# Patient Record
Sex: MALE | Race: WHITE | NOT HISPANIC OR LATINO | URBAN - METROPOLITAN AREA
[De-identification: names, ages, dates, MRNs, and addresses within clinical notes are randomized per-mention and may not be internally consistent; named-entity substitution may affect disease eponyms.]

---

## 2017-05-24 ENCOUNTER — IMPORTED ENCOUNTER (OUTPATIENT)
Dept: URBAN - METROPOLITAN AREA CLINIC 31 | Facility: CLINIC | Age: 67
End: 2017-05-24

## 2017-05-24 PROBLEM — H17.89: Noted: 2017-05-24

## 2017-05-24 PROBLEM — H25.813: Noted: 2017-05-24

## 2017-05-24 PROBLEM — H04.123: Noted: 2017-05-24

## 2017-05-24 PROCEDURE — 92014 COMPRE OPH EXAM EST PT 1/>: CPT

## 2017-05-24 NOTE — PATIENT DISCUSSION
1. Combined Types of Cataract OU: Explained how cataracts can effect vision. Recommend clinical observation. The patient was advised to contact us if any change or worsening of vision. 2. S/P  Lasik: monitor3. Dry Eyes OU:  Start artificials tears. Encouraged regular use. 4.  Return for an appointment in 1 year for comprehensive exam. with Dr. Paulina Jenkins.

## 2017-11-14 ENCOUNTER — IMPORTED ENCOUNTER (OUTPATIENT)
Dept: URBAN - METROPOLITAN AREA CLINIC 31 | Facility: CLINIC | Age: 67
End: 2017-11-14

## 2017-11-14 PROBLEM — R51: Noted: 2017-11-14

## 2017-11-14 PROBLEM — G43.B0: Noted: 2017-11-14

## 2017-11-14 PROBLEM — H04.123: Noted: 2017-11-14

## 2017-11-14 PROBLEM — H25.813: Noted: 2017-11-14

## 2017-11-14 PROCEDURE — 92014 COMPRE OPH EXAM EST PT 1/>: CPT

## 2017-11-14 NOTE — PATIENT DISCUSSION
1.  Opthalmic Migraine - Discussed opthalmic migraine auras and possibilty of avoiding triggers. Continue to follow with primary medical provider. 2. Dry Eye OU:  Continue current management with Artificial Tears. 3.  Headache of non-ocular origin - Patient has headache that cannot be explained by ocular exam and testing. Pain is usually when awakening in the morning. May be sinus related so see PCP. 4. Combined Types of Cataract OU: Explained how cataracts can effect vision. Recommend clinical observation. The patient was advised to contact us if any change or worsening of vision. To consider consult if VA worsens. 5. Return for an appointment in 6 months for comprehensive exam. with Dr. Shelbie Gonzalez.

## 2018-05-31 ENCOUNTER — IMPORTED ENCOUNTER (OUTPATIENT)
Dept: URBAN - METROPOLITAN AREA CLINIC 31 | Facility: CLINIC | Age: 68
End: 2018-05-31

## 2018-05-31 PROBLEM — H17.89: Noted: 2018-05-31

## 2018-05-31 PROBLEM — H25.813: Noted: 2018-05-31

## 2018-05-31 PROBLEM — H20.023: Noted: 2018-05-31

## 2018-05-31 PROCEDURE — 92014 COMPRE OPH EXAM EST PT 1/>: CPT

## 2018-05-31 NOTE — PATIENT DISCUSSION
Iritis: recurrent episode OU: Discussed that steroid and dilating drops will help with discomfort and resolve intraocular inflammation. Call office imediately with decreased vision increased pain and/or increased redness. Recommend systemic workup for etiolgy of inflammation including ESR CRP RF MURIEL HLA B-27 RPR FTA-abs ACE lyme titer and PPD.

## 2018-05-31 NOTE — PATIENT DISCUSSION
1. Combined Types of Cataract OU: Explained how cataracts can effect vision. Recommend clinical observation. The patient was advised to contact us if any change or worsening of vision. 2. Iritis: recurrent episode OU: Discussed that steroid and dilating drops will help with discomfort and resolve intraocular inflammation. Call office imediately with decreased vision increased pain and/or increased redness. Recommend systemic workup for etiolgy of inflammation including ESR CRP RF MURIEL HLA B-27 RPR FTA-abs ACE lyme titer and PPD. 3. S/P  Lasik OU - monitor4. Return for an appointment in 1 year for comprehensive exam. with Dr. Cy Hall.

## 2019-01-10 ENCOUNTER — IMPORTED ENCOUNTER (OUTPATIENT)
Dept: URBAN - METROPOLITAN AREA CLINIC 31 | Facility: CLINIC | Age: 69
End: 2019-01-10

## 2019-01-10 PROBLEM — H17.89: Noted: 2019-01-10

## 2019-01-10 PROBLEM — H20.023: Noted: 2019-01-10

## 2019-01-10 PROBLEM — H25.813: Noted: 2019-01-10

## 2019-01-10 PROCEDURE — 92014 COMPRE OPH EXAM EST PT 1/>: CPT

## 2019-01-10 NOTE — PATIENT DISCUSSION
1. Combined Types of Cataract OD>OS -monitor. 2.  Iritis: recurrent OU - use PF 1 gt QD OU. Monitor IOP. 3.  S/P  Lasik OU - monitor4. Return for an appointment in 6 months for comprehensive exam. with Dr. Maryana Silva.

## 2019-07-10 ENCOUNTER — IMPORTED ENCOUNTER (OUTPATIENT)
Dept: URBAN - METROPOLITAN AREA CLINIC 31 | Facility: CLINIC | Age: 69
End: 2019-07-10

## 2019-07-10 PROBLEM — H20.023: Noted: 2019-07-10

## 2019-07-10 PROBLEM — H25.813: Noted: 2019-07-10

## 2019-07-10 PROBLEM — H17.89: Noted: 2019-07-10

## 2019-07-10 PROCEDURE — 92014 COMPRE OPH EXAM EST PT 1/>: CPT

## 2019-07-10 NOTE — PATIENT DISCUSSION
1. Combined Types of Cataract OD>OS - monitor. If any further change in 6 months then consult. 2. Iritis: recurrent OU - quiet today. Continue PF 1 gt QD OU. Monitor IOP. 3. Had elevated sed and CRP. Was treated with steroids and values are back to normal. Important to follow up with his neurologist and rheumatologist. Temporal artery biopsy was negative at multiple sites. 3.  S/P  Lasik OU - monitor4. Return for an appointment in 6 months for comprehensive exam with Dr. Iam Gray.

## 2020-01-16 ENCOUNTER — IMPORTED ENCOUNTER (OUTPATIENT)
Dept: URBAN - METROPOLITAN AREA CLINIC 31 | Facility: CLINIC | Age: 70
End: 2020-01-16

## 2020-01-16 PROBLEM — H25.813: Noted: 2020-01-16

## 2020-01-16 PROBLEM — H20.023: Noted: 2020-01-16

## 2020-01-16 PROBLEM — H17.89: Noted: 2020-01-16

## 2020-01-16 PROCEDURE — 92014 COMPRE OPH EXAM EST PT 1/>: CPT

## 2020-01-16 PROCEDURE — 92015 DETERMINE REFRACTIVE STATE: CPT

## 2020-01-16 NOTE — PATIENT DISCUSSION
1. Combined Types of Cataract OD>OS - Consult for OD 2. Iritis: recurrent OU - quiet today. Continue PF 1 gt but try QOD OU. Monitor IOP. 3. Had elevated sed and CRP. Was treated with steroids and values are back to normal. Important to follow up with his neurologist and rheumatologist. Temporal artery biopsy was negative at multiple sites. 3.  S/P  Lasik OU - monitor4. Return for an appointment in 1 month for cataract consult OD with Dr. Ermelinda Vega.

## 2020-03-11 ENCOUNTER — IMPORTED ENCOUNTER (OUTPATIENT)
Dept: URBAN - METROPOLITAN AREA CLINIC 31 | Facility: CLINIC | Age: 70
End: 2020-03-11

## 2020-03-11 PROBLEM — H25.813: Noted: 2020-03-11

## 2020-03-11 PROBLEM — H20.023: Noted: 2020-03-11

## 2020-03-11 PROBLEM — H25.811: Noted: 2020-03-11

## 2020-03-11 PROCEDURE — 99213 OFFICE O/P EST LOW 20 MIN: CPT

## 2020-03-11 NOTE — PATIENT DISCUSSION
Discussed the risks benefits alternatives and limitations of cataract surgery including infection bleeding loss of vision retinal tears detachment. The patient stated a full understanding and a desire to proceed with the procedure in both eyes. Refractive options were reviewed. Patient has elected to be optimized for distance vision in both eyes. The patient will still need glasses for reading and to possibly fine tune distance vision. OD slow taper of prednisolone after Cat sx.  4x/d with decrease a drop every 2 weeks  Dextenza

## 2020-03-11 NOTE — PATIENT DISCUSSION
1.  Cataract OD: Discussed the risks benefits alternatives and limitations of cataract surgery including infection bleeding loss of vision retinal tears detachment. The patient stated a full understanding and a desire to proceed with the procedure in the right eye. Refractive options reviewed. Patient understands IOL calcs are more difficult and that intraoperative ORA measurements will increase the success but does not guarantee not needing a refractive enhancement. Pt desired to schedule surgery OD with ORA guidance. PORE plano  OD slow taper of prednisolone after Cat sx. 4x/d with decrease a drop every 2 weeks  Dextenza 2. Iritis:  recurrent episode OU: Quiet today use PF OU qd 3. Return for an appointment for [address] [place] [address] with Dr. Chandana Michelle.

## 2020-05-13 ENCOUNTER — IMPORTED ENCOUNTER (OUTPATIENT)
Dept: URBAN - METROPOLITAN AREA CLINIC 31 | Facility: CLINIC | Age: 70
End: 2020-05-13

## 2020-05-13 PROBLEM — H25.811: Noted: 2020-05-13

## 2020-05-13 PROCEDURE — 92136 OPHTHALMIC BIOMETRY: CPT

## 2020-05-13 PROCEDURE — 92025 CPTRIZED CORNEAL TOPOGRAPHY: CPT

## 2020-05-13 NOTE — PATIENT DISCUSSION
Cataract OD: Discussed the risks benefits alternatives and limitations of cataract surgery including infection bleeding loss of vision retinal tears detachment. The patient stated a full understanding and a desire to proceed with the procedure in the right eye. Refractive options reviewed. Patient understands IOL calcs are more difficult and that intraoperative ORA measurements will increase the success but does not guarantee not needing a refractive enhancement. Pt desired to schedule surgery OD with ORA guidance. ghanshyam shows prior lasik and mild irregularity

## 2020-05-19 ENCOUNTER — IMPORTED ENCOUNTER (OUTPATIENT)
Dept: URBAN - METROPOLITAN AREA CLINIC 31 | Facility: CLINIC | Age: 70
End: 2020-05-19

## 2020-05-19 PROBLEM — Z96.1: Noted: 2020-05-19

## 2020-05-19 PROCEDURE — 99024 POSTOP FOLLOW-UP VISIT: CPT

## 2020-05-19 NOTE — PATIENT DISCUSSION
Post-Op Day #1 - Cataract Surgery Right Eye (OD) - doing well. Tears prn. Continue postop drops as directed. Call office with symptoms of pain redness or decreased vision in operative eye. 1 drop tobramycin instilled.h/o chronic iritis will need steroid drops chronich/o LasikReturn for an appointment in 1 week for post op refraction. with Dr. Lucille Gonzales.

## 2020-05-27 ENCOUNTER — IMPORTED ENCOUNTER (OUTPATIENT)
Dept: URBAN - METROPOLITAN AREA CLINIC 31 | Facility: CLINIC | Age: 70
End: 2020-05-27

## 2020-05-27 PROBLEM — Z96.1: Noted: 2020-05-27

## 2020-05-27 PROCEDURE — 99024 POSTOP FOLLOW-UP VISIT: CPT

## 2020-05-27 NOTE — PATIENT DISCUSSION
1.  Post-Op Week #1 - Cataract Surgery Right Eye (OD) - Intraocular lens stable and surgery very well healed. Patient to resume all normal activities. Finish postop drops as directed. Final Refraction given if necessary. Call with any problems. Healing well. Discussed VA improvement. Recheck refraction at next visit. 2. Return for an appointment in 3 weeks for post op exam. with Dr. Arnulfo Hussein.

## 2020-06-09 ENCOUNTER — IMPORTED ENCOUNTER (OUTPATIENT)
Dept: URBAN - METROPOLITAN AREA CLINIC 31 | Facility: CLINIC | Age: 70
End: 2020-06-09

## 2020-06-09 PROBLEM — Z96.1: Noted: 2020-06-09

## 2020-06-09 PROCEDURE — 99024 POSTOP FOLLOW-UP VISIT: CPT

## 2020-06-09 NOTE — PATIENT DISCUSSION
1.  Post-Op Cataract Surgery 15-90 days Right Eye (OD):  Doing well with stable vision. Monitor for changes2. Papules on arm and chest are very itchy with no pain or tenderness. Appears to be allergic type dermatitis and not shingles. Pt is waiting for call from Dr. Mirlande Lo dermatology. 3.  Return for an appointment in 2 weeks for post op exam. with Dr. Jona Atkinson.

## 2020-06-09 NOTE — PATIENT DISCUSSION
Papules on arm and chest are very itchy with no pain or tenderness. Appears to be allergic type dermatitis and not shingles. Pt is waiting for call from Dr. Marline Mcarthur dermatology.

## 2020-06-24 ENCOUNTER — IMPORTED ENCOUNTER (OUTPATIENT)
Dept: URBAN - METROPOLITAN AREA CLINIC 31 | Facility: CLINIC | Age: 70
End: 2020-06-24

## 2020-06-24 PROBLEM — Z96.1: Noted: 2020-06-24

## 2020-06-24 PROCEDURE — 99024 POSTOP FOLLOW-UP VISIT: CPT

## 2020-06-24 NOTE — PATIENT DISCUSSION
1.  Post-Op Cataract Surgery 15-90 days Right Eye (OD):  Doing well with stable vision. Monitor for changes2. Can continue with OTC or possible SV rx or progressives if necessary. 3. Return for an appointment in 4 months for dilated fundus exam. with Dr. Regine Acosta.

## 2020-10-07 ENCOUNTER — IMPORTED ENCOUNTER (OUTPATIENT)
Dept: URBAN - METROPOLITAN AREA CLINIC 31 | Facility: CLINIC | Age: 70
End: 2020-10-07

## 2020-10-07 PROBLEM — Z96.1: Noted: 2020-10-07

## 2020-10-07 PROBLEM — H25.812: Noted: 2020-10-07

## 2020-10-07 PROBLEM — H20.023: Noted: 2020-10-07

## 2020-10-07 PROCEDURE — 99214 OFFICE O/P EST MOD 30 MIN: CPT

## 2020-10-07 NOTE — PATIENT DISCUSSION
1.  Pseudophakia OD - IOL stable. Monitor for changes in vision. 2. Combined Types of Cataract OS: Vision OS progressively worsening. Consult. 3. Iritis: recurrent episode OU: Complete work up with rheumatology unable to identify cause of autoimmune inflammatory conditions. 4. Return for an appointment in 6 months for cataract evaluation with Dr. Chandana Michelle.

## 2020-10-07 NOTE — PATIENT DISCUSSION
1. Combined Types of Cataract OS: Vision OS progressively worsening. Consult. 3. Iritis: recurrent episode OU: Complete work up with rheumatology unable to identify cause of autoimmune inflammatory conditions. 4. Return for an appointment in 6 months for cataract evaluation. with Dr. Qian Yun.

## 2021-03-25 ENCOUNTER — IMPORTED ENCOUNTER (OUTPATIENT)
Dept: URBAN - METROPOLITAN AREA CLINIC 31 | Facility: CLINIC | Age: 71
End: 2021-03-25

## 2021-03-25 PROBLEM — H20.023: Noted: 2021-03-25

## 2021-03-25 PROBLEM — H43.811: Noted: 2021-03-25

## 2021-03-25 PROBLEM — H25.812: Noted: 2021-03-25

## 2021-03-25 PROBLEM — Z96.1: Noted: 2021-03-25

## 2021-03-25 PROCEDURE — 92250 FUNDUS PHOTOGRAPHY W/I&R: CPT

## 2021-03-25 PROCEDURE — 99214 OFFICE O/P EST MOD 30 MIN: CPT

## 2021-03-25 NOTE — PATIENT DISCUSSION
1.  Pseudophakia OD - IOL stable. Monitor for changes in vision. 2. Combined Types of Cataract OS: Vision OS progressively worsening. Consult. 3. Iritis: hx of recurrent episodes OU: Complete work up with rheumatology unable to identify cause of autoimmune inflammatory conditions. 4. Return for an appointment in 6 weeks for DFTA with Dr. Jose Goss.

## 2021-03-25 NOTE — PATIENT DISCUSSION
1.  PVD OD: Patient was cautioned to call our office immediately if they experience a substantial change in their symptoms such as an increase in floaters persistent flashes loss of visual field (may appear as a shadow or a curtain) or decrease in visual acuity as these may indicate a retinal tear or detachment. If this is a new problem patient will need to return for re-examination  as determined by the [address]. Pseudophakia OD - IOL stable. Monitor for changes in vision. 3. Combined Types of Cataract OS: Vision OS progressively worsening. Consult. 4. Iritis: hx of recurrent episodes OU: Complete work up with rheumatology unable to identify cause of autoimmune inflammatory conditions. 5. Return for an appointment in 6 weeks for DFTA with Dr. Stefan Cooley.

## 2021-04-07 ENCOUNTER — IMPORTED ENCOUNTER (OUTPATIENT)
Dept: URBAN - METROPOLITAN AREA CLINIC 31 | Facility: CLINIC | Age: 71
End: 2021-04-07

## 2021-04-07 PROBLEM — H26.491: Noted: 2021-04-07

## 2021-04-07 PROBLEM — H20.13: Noted: 2021-04-07

## 2021-04-07 PROBLEM — H25.812: Noted: 2021-04-07

## 2021-04-07 PROBLEM — Z96.1: Noted: 2021-04-07

## 2021-04-07 PROCEDURE — 92015 DETERMINE REFRACTIVE STATE: CPT

## 2021-04-07 PROCEDURE — 99213 OFFICE O/P EST LOW 20 MIN: CPT

## 2021-04-07 PROCEDURE — 92025 CPTRIZED CORNEAL TOPOGRAPHY: CPT

## 2021-04-07 NOTE — PATIENT DISCUSSION
1.  Cataract OS: Discussed the risks benefits alternatives and limitations of cataract surgery including infection bleeding loss of vision retinal tears detachment. The patient stated a full understanding and a desire to proceed with the procedure in the left eye. Refractive options reviewed. Patient understands IOL calcs are more difficult and that intraoperative ORA measurements will increase the success but does not guarantee not needing a refractive enhancement. Pt desired to schedule surgery OS with ORA guidance. Pore plano h/o iritis PRED 4x/d with taper a drop every 2 weeks down to qd chronic with DextenzaPaperwork only2. Pseudophakia OD - IOL stable. Monitor for changes in vision. 3. PCO  OD (Posterior Capsule Opacification)  Not visually significant at this time. Monitor for yag capsulotomy necessity. 4. Chronic iritis Continue topical steroids as directed.

## 2021-04-09 ENCOUNTER — IMPORTED ENCOUNTER (OUTPATIENT)
Dept: URBAN - METROPOLITAN AREA CLINIC 31 | Facility: CLINIC | Age: 71
End: 2021-04-09

## 2021-04-20 ENCOUNTER — IMPORTED ENCOUNTER (OUTPATIENT)
Dept: URBAN - METROPOLITAN AREA CLINIC 31 | Facility: CLINIC | Age: 71
End: 2021-04-20

## 2021-04-20 PROBLEM — Z96.1: Noted: 2021-04-20

## 2021-04-20 PROCEDURE — 99024 POSTOP FOLLOW-UP VISIT: CPT

## 2021-04-20 NOTE — PATIENT DISCUSSION
1.  Post-Op Day #1 - Cataract Surgery Left Eye (OS) - doing well. Tears prn. Continue postop drops as directed. Call office with symptoms of pain redness or decreased vision in operative eye.  1 drop of tobramycin instilled2. Return for an appointment in 1 week for post op exam. with Dr. Eugene Tabor.

## 2021-04-27 ENCOUNTER — IMPORTED ENCOUNTER (OUTPATIENT)
Dept: URBAN - METROPOLITAN AREA CLINIC 31 | Facility: CLINIC | Age: 71
End: 2021-04-27

## 2021-04-27 PROBLEM — Z96.1: Noted: 2021-04-27

## 2021-04-27 PROCEDURE — 99024 POSTOP FOLLOW-UP VISIT: CPT

## 2021-04-27 NOTE — PATIENT DISCUSSION
1.  Post-Op Week #1 - Cataract Surgery Left Eye (OS) -  Intraocular lens stable and surgery very well healed. Patient to resume all normal activities. Finish postop drops as directed. Final Refraction given if decided to get progressives. Use Systane between medicated gtt. .  Call with any problems. 2. Return for an appointment in 4 months for dilated fundus exam. with Dr. Christopher Brooks.

## 2021-08-31 ENCOUNTER — IMPORTED ENCOUNTER (OUTPATIENT)
Dept: URBAN - METROPOLITAN AREA CLINIC 31 | Facility: CLINIC | Age: 71
End: 2021-08-31

## 2021-08-31 PROBLEM — H43.811: Noted: 2021-08-31

## 2021-08-31 PROBLEM — Z96.1: Noted: 2021-08-31

## 2021-08-31 PROBLEM — H20.023: Noted: 2021-08-31

## 2021-08-31 PROCEDURE — 99214 OFFICE O/P EST MOD 30 MIN: CPT

## 2021-08-31 PROCEDURE — 92250 FUNDUS PHOTOGRAPHY W/I&R: CPT

## 2021-08-31 NOTE — PATIENT DISCUSSION
1.  Pseudophakia OU - IOLs stable. Monitor for changes in vision. 2. PVD OD: Patient was cautioned to call our office immediately if they experience a substantial change in their symptoms such as an increase in floaters persistent flashes loss of visual field (may appear as a shadow or a curtain) or decrease in visual acuity as these may indicate a retinal tear or detachment. If this is a new problem patient will need to return for re-examination  as determined by the [email or web address] Iritis: recurrent episode OU: none today. Uses Pred QD. Switch to Inveltys when Pred finished and monitor IOPs. 4. Return for an appointment in 6 months for pressure check with Dr. Eugene Tabor.

## 2022-02-28 ENCOUNTER — IMPORTED ENCOUNTER (OUTPATIENT)
Dept: URBAN - METROPOLITAN AREA CLINIC 31 | Facility: CLINIC | Age: 72
End: 2022-02-28

## 2022-02-28 PROBLEM — H43.811: Noted: 2022-02-28

## 2022-02-28 PROBLEM — H20.023: Noted: 2022-02-28

## 2022-02-28 PROBLEM — Z96.1: Noted: 2022-02-28

## 2022-02-28 PROCEDURE — 99214 OFFICE O/P EST MOD 30 MIN: CPT

## 2022-02-28 NOTE — PATIENT DISCUSSION
1.  Pseudophakia OU - IOLs stable. Monitor for changes in vision. 2. PVD OD: Monitor. 3. Iritis: recurrent episode OU: none today. Uses Pred QD. Switch to Inveltys but found not as effective as Pred. No IOP spikes so can continue to use QD OU but monitor IOPs. 4. Return for an appointment in 6 months for complete exam with Dr. Shraddha Dumont.

## 2022-04-02 ASSESSMENT — VISUAL ACUITY
OD_CC: 20/30
OD_CC: 20/25-1
OD_CC: 20/20-3
OS_CC: 20/30-2
OD_CC: 20/20
OS_CC: 20/20-1
OD_CC: 20/30
OS_CC: 20/20-1
OS_CC: 20/25-1
OS_CC: 20/50-2
OS_CC: 20/20
OS_SC: 20/70
OS_GLARE: 20/50MED
OU_CC: 20/20
OD_CC: 20/40+2
OD_CC: 20/40+1
OS_CC: 20/20-1
OD_CC: 20/30-1
OS_CC: 20/25
OD_CC: 20/25-2
OS_CC: 20/30-2
OD_CC: 20/20
OU_CC: 20/40
OS_CC: 20/20-1
OD_CC: 20/20
OS_PH: SC 20/25 -3
OD_CC: 20/25-2
OS_CC: 20/25-1
OU_CC: 20/20
OD_CC: 20/30
OD_CC: 20/25
OS_CC: 20/25
OS_CC: 20/30
OD_SC: 20/40
OD_CC: 20/25-1
OU_CC: 20/25
OS_PH: SC 20/30 -2
OD_CC: 20/30+2
OS_CC: 20/25
OD_CC: 20/30
OS_CC: 20/50
OS_CC: 20/25
OS_CC: 20/20
OS_CC: 20/50-2

## 2022-04-02 ASSESSMENT — TONOMETRY
OS_IOP_MMHG: 15
OD_IOP_MMHG: 12
OD_IOP_MMHG: 18
OS_IOP_MMHG: 14
OD_IOP_MMHG: 13
OS_IOP_MMHG: 18
OD_IOP_MMHG: 14
OD_IOP_MMHG: 14
OS_IOP_MMHG: 16
OD_IOP_MMHG: 17
OS_IOP_MMHG: 16
OS_IOP_MMHG: 14
OS_IOP_MMHG: 14
OS_IOP_MMHG: 16
OS_IOP_MMHG: 14
OD_IOP_MMHG: 20
OD_IOP_MMHG: 14
OD_IOP_MMHG: 14
OD_IOP_MMHG: 19
OS_IOP_MMHG: 12
OD_IOP_MMHG: 14
OS_IOP_MMHG: 18
OD_IOP_MMHG: 16
OS_IOP_MMHG: 18
OD_IOP_MMHG: 14
OS_IOP_MMHG: 13

## 2022-07-09 ENCOUNTER — TELEPHONE ENCOUNTER (OUTPATIENT)
Dept: URBAN - METROPOLITAN AREA CLINIC 121 | Facility: CLINIC | Age: 72
End: 2022-07-09

## 2022-07-09 RX ORDER — EXTENDED PHENYTOIN SODIUM 30 MG/1
CAPSULE ORAL
Refills: 0 | OUTPATIENT
Start: 2012-01-05 | End: 2014-09-11

## 2022-07-09 RX ORDER — ALPRAZOLAM 1 MG
TABLET ORAL
Refills: 0 | OUTPATIENT
Start: 2012-01-05 | End: 2014-09-11

## 2022-07-09 RX ORDER — LOSARTAN POTASSIUM 50 MG/1
TABLET, FILM COATED ORAL
Refills: 0 | OUTPATIENT
Start: 2012-01-05 | End: 2014-09-11

## 2022-07-09 RX ORDER — VITAMIN B COMPLEX
CAPSULE ORAL
Refills: 0 | OUTPATIENT
Start: 2014-09-11 | End: 2014-09-11

## 2022-07-09 RX ORDER — OMEGA-3S/DHA/EPA/FISH OIL 980-1400MG
CAPSULE,DELAYED RELEASE (ENTERIC COATED) ORAL
Refills: 0 | OUTPATIENT
Start: 2012-01-05 | End: 2014-09-11

## 2022-07-09 RX ORDER — MAGNESIUM HYDROXIDE 400 MG/5ML
SUSPENSION, ORAL (FINAL DOSE FORM) ORAL
Refills: 0 | OUTPATIENT
Start: 2012-01-05 | End: 2014-09-11

## 2022-07-09 RX ORDER — ATENOLOL 50 MG/1
TABLET ORAL
Refills: 0 | OUTPATIENT
Start: 2012-01-05 | End: 2014-09-11

## 2022-07-09 RX ORDER — LOVASTATIN 10 MG/1
TABLET ORAL
Refills: 0 | OUTPATIENT
Start: 2012-01-05 | End: 2014-09-11

## 2022-07-09 RX ORDER — OXYCODONE AND ACETAMINOPHEN 10; 325 MG/1; MG/1
TABLET ORAL
Refills: 0 | OUTPATIENT
Start: 2012-01-05 | End: 2014-09-11

## 2022-07-09 RX ORDER — ASPIRIN 81 MG/1
TABLET, DELAYED RELEASE ORAL
Refills: 0 | OUTPATIENT
Start: 2012-01-05 | End: 2014-09-11

## 2022-07-09 RX ORDER — HYOSCYAMINE SULFATE 0.12 MG/1
TABLET, ORALLY DISINTEGRATING ORAL; SUBLINGUAL
Refills: 0 | OUTPATIENT
Start: 2012-01-05 | End: 2014-09-11

## 2022-07-10 ENCOUNTER — TELEPHONE ENCOUNTER (OUTPATIENT)
Dept: URBAN - METROPOLITAN AREA CLINIC 121 | Facility: CLINIC | Age: 72
End: 2022-07-10

## 2022-07-10 RX ORDER — ASPIRIN 81 MG/1
TABLET, DELAYED RELEASE ORAL
Refills: 0 | Status: ACTIVE | COMMUNITY
Start: 2014-09-11

## 2022-07-10 RX ORDER — LOSARTAN POTASSIUM 50 MG/1
TABLET, FILM COATED ORAL
Refills: 0 | Status: ACTIVE | COMMUNITY
Start: 2014-09-11

## 2022-07-10 RX ORDER — LOPERAMIDE HYDROCHLORIDE 1 MG/7.5ML
SOLUTION ORAL
Refills: 0 | Status: ACTIVE | COMMUNITY
Start: 2014-09-11

## 2022-07-10 RX ORDER — TRAMADOL HYDROCHLORIDE 50 MG/1
TABLET ORAL
Refills: 0 | Status: ACTIVE | COMMUNITY
Start: 2014-09-11

## 2022-07-10 RX ORDER — OXYCODONE HYDROCHLORIDE AND ACETAMINOPHEN 10; 325 MG/1; MG/1
TABLET ORAL
Refills: 0 | Status: ACTIVE | COMMUNITY
Start: 2014-09-11

## 2022-07-10 RX ORDER — OMEGA-3S/DHA/EPA/FISH OIL 980-1400MG
CAPSULE,DELAYED RELEASE (ENTERIC COATED) ORAL
Refills: 0 | Status: ACTIVE | COMMUNITY
Start: 2014-09-11

## 2022-07-10 RX ORDER — ALPRAZOLAM 0.5 MG/1
TABLET ORAL
Refills: 0 | Status: ACTIVE | COMMUNITY
Start: 2014-09-11

## 2022-07-10 RX ORDER — LOVASTATIN 10 MG/1
TABLET ORAL
Refills: 0 | Status: ACTIVE | COMMUNITY
Start: 2014-09-11

## 2022-07-10 RX ORDER — IBUPROFEN 600 MG/1
TABLET, FILM COATED ORAL
Refills: 0 | Status: ACTIVE | COMMUNITY
Start: 2014-09-11

## 2022-07-10 RX ORDER — METAXALONE 800 MG
TABLET ORAL
Refills: 0 | Status: ACTIVE | COMMUNITY
Start: 2014-09-11